# Patient Record
Sex: MALE | Race: WHITE | Employment: STUDENT | URBAN - NONMETROPOLITAN AREA
[De-identification: names, ages, dates, MRNs, and addresses within clinical notes are randomized per-mention and may not be internally consistent; named-entity substitution may affect disease eponyms.]

---
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
## 2023-07-10 ENCOUNTER — DOCTOR'S OFFICE (OUTPATIENT)
Dept: URBAN - NONMETROPOLITAN AREA CLINIC 1 | Facility: CLINIC | Age: 18
Setting detail: OPHTHALMOLOGY
End: 2023-07-10
Payer: COMMERCIAL

## 2023-07-10 DIAGNOSIS — H52.203: ICD-10-CM

## 2023-07-10 DIAGNOSIS — H52.13: ICD-10-CM

## 2023-07-10 PROCEDURE — 92014 COMPRE OPH EXAM EST PT 1/>: CPT | Performed by: OPTOMETRIST

## 2023-07-10 ASSESSMENT — REFRACTION_MANIFEST
OS_CYLINDER: -2.50
OD_VA2: 20/20-2
OS_VA1: 20/20-2
OD_VA1: 20/20-2
OD_CYLINDER: -1.50
OS_VA2: 20/20-2
OS_AXIS: 165
OD_SPHERE: -5.00
OD_AXIS: 015
OS_SPHERE: -5.00

## 2023-07-10 ASSESSMENT — REFRACTION_CURRENTRX
OS_SPHERE: -4.75
OD_VPRISM_DIRECTION: SV
OD_CYLINDER: -1.50
OS_AXIS: 166
OD_SPHERE: -4.75
OS_VPRISM_DIRECTION: SV
OS_CYLINDER: -2.75
OS_OVR_VA: 20/
OD_AXIS: 013
OD_OVR_VA: 20/

## 2023-07-10 ASSESSMENT — REFRACTION_AUTOREFRACTION
OD_AXIS: 017
OS_CYLINDER: -2.50
OS_SPHERE: -4.75
OD_CYLINDER: -1.25
OD_SPHERE: -5.00
OS_AXIS: 169

## 2023-07-10 ASSESSMENT — TONOMETRY
OD_IOP_MMHG: 18
OS_IOP_MMHG: 18

## 2023-07-10 ASSESSMENT — SPHEQUIV_DERIVED
OD_SPHEQUIV: -5.75
OS_SPHEQUIV: -6.25
OD_SPHEQUIV: -5.625
OS_SPHEQUIV: -6

## 2023-07-10 ASSESSMENT — CONFRONTATIONAL VISUAL FIELD TEST (CVF)
OD_FINDINGS: FULL
OS_FINDINGS: FULL

## 2023-07-10 ASSESSMENT — VISUAL ACUITY
OS_BCVA: 20/20-1
OD_BCVA: 20/20-1

## 2024-04-04 ENCOUNTER — OPTICAL OFFICE (OUTPATIENT)
Dept: URBAN - NONMETROPOLITAN AREA CLINIC 4 | Facility: CLINIC | Age: 19
Setting detail: OPHTHALMOLOGY
End: 2024-04-04
Payer: COMMERCIAL

## 2024-04-04 DIAGNOSIS — H52.203: ICD-10-CM

## 2024-04-04 PROCEDURE — V2108 SPHEROCYLINDER 4.25D/2.12-4D: HCPCS | Mod: LT | Performed by: OPTOMETRIST

## 2024-04-04 PROCEDURE — V2744 TINT PHOTOCHROMATIC LENS/ES: HCPCS | Mod: LT | Performed by: OPTOMETRIST

## 2024-04-04 PROCEDURE — V2784 LENS POLYCARB OR EQUAL: HCPCS | Performed by: OPTOMETRIST

## 2024-04-04 PROCEDURE — V2750 ANTI-REFLECTIVE COATING: HCPCS | Performed by: OPTOMETRIST

## 2024-04-04 PROCEDURE — V2784 LENS POLYCARB OR EQUAL: HCPCS | Mod: LT | Performed by: OPTOMETRIST

## 2024-04-04 PROCEDURE — V2744 TINT PHOTOCHROMATIC LENS/ES: HCPCS | Performed by: OPTOMETRIST

## 2024-04-04 PROCEDURE — V2107 SPHEROCYLINDER 4.25D/12-2D: HCPCS | Mod: RT | Performed by: OPTOMETRIST

## 2024-04-04 PROCEDURE — V2020 VISION SVCS FRAMES PURCHASES: HCPCS | Performed by: OPTOMETRIST

## 2024-04-04 PROCEDURE — V2750 ANTI-REFLECTIVE COATING: HCPCS | Mod: LT | Performed by: OPTOMETRIST

## 2024-04-11 ENCOUNTER — APPOINTMENT (EMERGENCY)
Dept: RADIOLOGY | Facility: HOSPITAL | Age: 19
End: 2024-04-11
Payer: COMMERCIAL

## 2024-04-11 ENCOUNTER — HOSPITAL ENCOUNTER (EMERGENCY)
Facility: HOSPITAL | Age: 19
Discharge: HOME | End: 2024-04-11
Payer: COMMERCIAL

## 2024-04-11 VITALS
RESPIRATION RATE: 20 BRPM | OXYGEN SATURATION: 98 % | SYSTOLIC BLOOD PRESSURE: 140 MMHG | DIASTOLIC BLOOD PRESSURE: 103 MMHG | HEART RATE: 108 BPM | TEMPERATURE: 98 F

## 2024-04-11 DIAGNOSIS — S83.91XA RIGHT KNEE SPRAIN: Primary | ICD-10-CM

## 2024-04-11 DIAGNOSIS — M79.671 RIGHT FOOT PAIN: ICD-10-CM

## 2024-04-11 PROCEDURE — 99284 EMERGENCY DEPT VISIT MOD MDM: CPT | Performed by: PHYSICIAN ASSISTANT

## 2024-04-11 PROCEDURE — 99283 EMERGENCY DEPT VISIT LOW MDM: CPT

## 2024-04-11 PROCEDURE — 73630 X-RAY EXAM OF FOOT: CPT

## 2024-04-11 PROCEDURE — 73564 X-RAY EXAM KNEE 4 OR MORE: CPT

## 2024-04-11 RX ORDER — ACETAMINOPHEN 325 MG/1
650 TABLET ORAL ONCE
Status: COMPLETED | OUTPATIENT
Start: 2024-04-11 | End: 2024-04-11

## 2024-04-11 RX ADMIN — ACETAMINOPHEN 325MG 650 MG: 325 TABLET ORAL at 18:40

## 2024-04-11 NOTE — DISCHARGE INSTRUCTIONS
Utilize knee immobilizer as we discussed.  Use crutches.  Follow-up with our sports medicine physician.  Return with any new or worsening symptoms  Your x-ray was reviewed today in the emergency department and there was no obvious abnormalities found, however, the imaging will be reviewed by the radiologist later this evening or the following day and if there is any abnormalities found you will get a phone call with those results.

## 2024-04-11 NOTE — Clinical Note
Darion Mchugh was seen and treated in our emergency department on 4/11/2024.                Diagnosis:     Darion  may return to work on return date.    He may return on this date: 04/12/2024    Please make appropriate accommodations while patient is utilizing brace and crutches until cleared by orthopedics     If you have any questions or concerns, please don't hesitate to call.      Taniya Cao PA-C    ______________________________           _______________          _______________  Hospital Representative                              Date                                Time

## 2024-04-11 NOTE — ED PROVIDER NOTES
"History  Chief Complaint   Patient presents with    Knee Injury     Injured his right knee while playing basketball. States he is able to walk on it but its painful and is also having pain to the top of his right foot     19-year-old male presents emergency department for evaluation of right knee and right foot pain.  Patient states just prior to arrival he was playing basketball with friends when he \"did something\" to the right knee.  States he is able to walk but the knee feels unsteady.  Reports \"it feels like it is moving forward and left\" when he ambulates.  He denies any previous injury to the knee.  Denies any significant swelling.  Denies any direct trauma or injury to the foot.        None       History reviewed. No pertinent past medical history.    History reviewed. No pertinent surgical history.    History reviewed. No pertinent family history.  I have reviewed and agree with the history as documented.    E-Cigarette/Vaping     E-Cigarette/Vaping Substances     Social History     Tobacco Use    Smoking status: Never    Smokeless tobacco: Never   Substance Use Topics    Alcohol use: Not Currently    Drug use: Not Currently       Review of Systems   Constitutional:  Negative for appetite change, fatigue and fever.   Respiratory: Negative.     Cardiovascular: Negative.    Musculoskeletal:  Positive for arthralgias. Negative for joint swelling.   Skin: Negative.  Negative for color change.   Neurological: Negative.    All other systems reviewed and are negative.      Physical Exam  Physical Exam  Vitals and nursing note reviewed.   Constitutional:       General: He is not in acute distress.     Appearance: Normal appearance. He is not ill-appearing, toxic-appearing or diaphoretic.   HENT:      Head: Normocephalic.   Eyes:      Conjunctiva/sclera: Conjunctivae normal.   Pulmonary:      Effort: Pulmonary effort is normal.   Musculoskeletal:         General: Tenderness present. Normal range of motion.      " Right knee: Bony tenderness present. No swelling. Normal range of motion. Tenderness present over the medial joint line.      Right foot: Normal range of motion. Tenderness and bony tenderness present. No swelling.   Skin:     General: Skin is warm and dry.      Findings: No bruising, erythema or rash.   Neurological:      General: No focal deficit present.      Mental Status: He is alert.   Psychiatric:         Mood and Affect: Mood normal.         Vital Signs  ED Triage Vitals [04/11/24 1801]   Temperature Pulse Respirations Blood Pressure SpO2   98 °F (36.7 °C) (!) 108 20 (!) 140/103 98 %      Temp Source Heart Rate Source Patient Position - Orthostatic VS BP Location FiO2 (%)   Temporal Monitor Sitting -- --      Pain Score       5           Vitals:    04/11/24 1801   BP: (!) 140/103   Pulse: (!) 108   Patient Position - Orthostatic VS: Sitting         Visual Acuity      ED Medications  Medications   acetaminophen (TYLENOL) tablet 650 mg (650 mg Oral Given 4/11/24 1840)       Diagnostic Studies  Results Reviewed       None                   XR knee 4+ vw right injury    (Results Pending)   XR foot 3+ vw right    (Results Pending)              Procedures  Procedures         ED Course  ED Course as of 04/11/24 1941   Thu Apr 11, 2024 1940 This is a late entry.  ED interpretation of foot and knee x-ray were negative for acute osseous injury.  Due to patient's instability feeling with ambulation was treated with knee immobilizer and crutches.  We did discuss follow-up with sports medicine and he verbalized understanding.  Patient was clinically and hemodynamically stable for discharge                               SBIRT 22yo+      Flowsheet Row Most Recent Value   Initial Alcohol Screen: US AUDIT-C     1. How often do you have a drink containing alcohol? 2 Filed at: 04/11/2024 1841   2. How many drinks containing alcohol do you have on a typical day you are drinking?  0 Filed at: 04/11/2024 1841   3a. Male UNDER  65: How often do you have five or more drinks on one occasion? 0 Filed at: 04/11/2024 1841   3b. FEMALE Any Age, or MALE 65+: How often do you have 4 or more drinks on one occassion? 0 Filed at: 04/11/2024 1841   Audit-C Score 2 Filed at: 04/11/2024 1841   MARY ANNE: How many times in the past year have you...    Used an illegal drug or used a prescription medication for non-medical reasons? Never Filed at: 04/11/2024 1841                      Medical Decision Making  19 year old male presented to the ED for evaluation of right knee pain and right foot pain status post play basketball.  Patient at risk for the following but not limited to fracture, contusion, dislocation, sprain, ligamentous injury.  ED interpretation of x-ray negative for acute osseous injury.  Patient was treated symptomatically.  We discussed RICE therapy, appropriate follow-up and symptomatic treatment at home.  We discussed strict return precautions and patient verbalized understanding.  Patient was clinically and hemodynamically stable for discharge.      Amount and/or Complexity of Data Reviewed  Radiology: ordered.    Risk  OTC drugs.             Disposition  Final diagnoses:   Right knee sprain   Right foot pain     Time reflects when diagnosis was documented in both MDM as applicable and the Disposition within this note       Time User Action Codes Description Comment    4/11/2024  7:05 PM Taniya Cao Add [S83.91XA] Right knee sprain     4/11/2024  7:05 PM Taniya Cao Add [M79.671] Right foot pain           ED Disposition       ED Disposition   Discharge    Condition   Stable    Date/Time   Thu Apr 11, 2024 1905    Comment   Darion Mchugh discharge to home/self care.                   Follow-up Information       Follow up With Specialties Details Why Contact Info    Latanya Braxton DO Family Medicine   [address] [place] [id number] [phone]      Ry Gtz MD Orthopedic Surgery, Sports Medicine   [address]  [facility]  [address] [place] [id number]  [phone]              There are no discharge medications for this patient.          PDMP Review       None            ED Provider  Electronically Signed by             Taniya Cao PA-C  04/11/24 6137

## 2024-04-19 VITALS
DIASTOLIC BLOOD PRESSURE: 80 MMHG | SYSTOLIC BLOOD PRESSURE: 130 MMHG | TEMPERATURE: 97.7 F | WEIGHT: 315 LBS | BODY MASS INDEX: 49.44 KG/M2 | HEART RATE: 81 BPM | HEIGHT: 67 IN

## 2024-04-19 DIAGNOSIS — M25.561 ACUTE PAIN OF RIGHT KNEE: Primary | ICD-10-CM

## 2024-04-19 DIAGNOSIS — S93.601A SPRAIN OF RIGHT FOOT, INITIAL ENCOUNTER: ICD-10-CM
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
## 2024-04-19 DIAGNOSIS — S83.8X1A SPRAIN OF OTHER LIGAMENT OF RIGHT KNEE, INITIAL ENCOUNTER: ICD-10-CM

## 2024-04-19 PROCEDURE — 99204 OFFICE O/P NEW MOD 45 MIN: CPT | Performed by: STUDENT IN AN ORGANIZED HEALTH CARE EDUCATION/TRAINING PROGRAM

## 2024-04-19 RX ORDER — NAPROXEN 500 MG/1
500 TABLET ORAL 2 TIMES DAILY WITH MEALS
Qty: 14 TABLET | Refills: 0 | Status: SHIPPED | OUTPATIENT
Start: 2024-04-19 | End: 2024-04-26

## 2024-04-19 NOTE — PROGRESS NOTES
1. Acute pain of right knee        2. Sprain of other ligament of right knee, initial encounter  Ambulatory Referral to Orthopedic Surgery    naproxen (NAPROSYN) 500 mg tablet      3. Sprain of right foot, initial encounter  naproxen (NAPROSYN) 500 mg tablet      4. Injury while playing basketball          No orders of the defined types were placed in this encounter.       Imaging Studies (I personally reviewed images in PACS and report):    X-ray right foot 4/11/2024: No acute osseous abnormalities.  No significant degenerative changes.  Unremarkable soft tissues.  X-ray right knee 4/11/2024: No acute osseous abnormalities.  No joint effusion.  No significant degenerative changes.  Unremarkable soft tissues.    IMPRESSION:  Acute right knee pain/injury - suspected MCL sprain, medial myofascial strain  Acute right dorsal foot pain/injury-improving and suspected myofascial strain  Secondary to twisting injury while playing basketball    Other factors:  BMI 50    PLAN:    Clinical exam and radiographic imaging reviewed with patient today, with impression as per above. I have discussed with the patient the pathophysiology of this diagnosis and reviewed how the examination correlates with this diagnosis.    Imaging obtained/reviewed as per above.   Recommended conservative treatment at this time.  Counseled to discontinue use of immobilizer and crutches at this time and can weight-bear as tolerated on his right lower extremity.  Counseled that he can continue to watch and wait for now given the symptoms are improving or he can be seen by formal physical therapy to help facilitate recovery.  I counseled that sprains of the knee typically take about 4 to 6 weeks to recover.  Patient prefers to watch and wait for now and manage the pain and do home exercise/stretches on his own.  In regards to pain control prescribed naproxen 500 mg p.o. twice daily to consistently take for 1 week then only as needed thereafter.  Counseled  "not to concurrently take other NSAIDs with naproxen but could use acetaminophen along with heat/ice therapy 20 minutes on/off.  Offered hinged knee brace for stability while recovering but patient deferred this option for now.  I counseled he could also use a compression knee sleeve as needed.    Return if symptoms worsen or fail to improve.    Portions of the record may have been created with voice recognition software. Occasional wrong word or \"sound a like\" substitutions may have occurred due to the inherent limitations of voice recognition software. Read the chart carefully and recognize, using context, where substitutions have occurred.     CHIEF COMPLAINT:  Chief Complaint   Patient presents with    Right Knee - Pain    Right Foot - Pain       HPI:  Darion Mchugh is a 19 y.o. male  who presents for       Visit 4/19/2024:  Initial evaluation of right knee and foot injury:  Precipitating injury on 4/11/2024  Injury occurred while participating in basketball.  While performing a layup he believes he landed awkwardly on his right lower extremity causing him to twisting sensation his right knee and foot.  He denies a popping sensation but did feel a straining sensation of his right knee and right dorsal foot.  He states he was still able to weight-bear after this incident on his right lower extremity with a slight limp.  He went to the ER the same day and had imaging done as noted above. ER note reviewed.  He was placed in a knee immobilizer and crutches.  Patient admits he has not been fully adherent to using the immobilizer as it continues to slide down.  He also feels that his pain has been improving that he has been able to tolerate weightbearing on his right lower extremity with less intense pain over time.  In regards to his right foot he states the pain at this point is mainly over the dorsum of his foot he states is of mild intensity and describes as an aching sensation exacerbated by range of motion " "movements of his foot.  He states his right knee pain is mainly localized over the medial aspect.  Again he states the pain has been progressively improving and describes as a sharp/aching pain that is worse with flexion of his knee.  He does report a generalized stiffness of his knee in general but thinks it may have been due to the bracing.  He denies any swelling, discoloration, numbness/tingling.  Denies prior surgeries of his right knee in the past.  In regards to pain control he has been using acetaminophen, NSAIDs, icing all of which provide some relief.  4/19/2024.      Medical, Surgical, Family, and Social History    History reviewed. No pertinent past medical history.  History reviewed. No pertinent surgical history.  Social History   Social History     Substance and Sexual Activity   Alcohol Use Yes    Comment: socially     Social History     Substance and Sexual Activity   Drug Use Never     Social History     Tobacco Use   Smoking Status Never   Smokeless Tobacco Never     History reviewed. No pertinent family history.  No Known Allergies       Physical Exam  /80   Pulse 81   Temp 97.7 °F (36.5 °C) (Temporal)   Ht 5' 7\" (1.702 m)   Wt (!) 147 kg (323 lb)   BMI 50.59 kg/m²     Constitutional:  see vital signs  Gen: Obese, normocephalic/atraumatic, well-groomed  Eyes: No inflammation or discharge of conjunctiva or lids; sclera clear   Pharynx: no inflammation, lesion, or mass of lips  Pulmonary/Chest: Effort normal. No respiratory distress.     Ortho Exam  Patient seen wearing/using knee immobilizer for right lower extremity    Right Knee Exam:  Erythema: no  Swelling: no  Increased Warmth: no  Tenderness: +MJL  ROM: 0-130  Knee flexion strength: 5/5  Knee extension strength: 5/5  Patellar Grind: negative  Lachman's: negative  Anterior Drawer: negative  Posterior Drawer:  Varus laxity: negative  Valgus laxity: negative, but +aggravation of medial knee pain  Allie: negative     No calf " tenderness to palpation bilaterally    Right foot exam:  Inspection: No edema, erythema, ecchymosis, wounds or drainage  Palpation: Positive tenderness over the general dorsal aspect of the midfoot/tibialis anterior  ROM: Dorsiflexion 30 degrees, plantarflexion 50 degrees  Strength: Resisted extension 5/5 in aggravates dorsal foot pain, resisted plantarflexion 5/5, eversion 5/5, inversion 5/5    Gait: slight antalgia    Procedures

## 2024-04-19 NOTE — LETTER
April 19, 2024     Patient: Darion Mchugh  YOB: 2005  Date of Visit: 4/19/2024      To Whom it May Concern:    Darion Mchugh is under my professional care. Darion was seen in my office on 4/19/2024. Please excuse him from work today. Can return tomorrow as tolerated without restrictions on 4/20/2024.    If you have any questions or concerns, please don't hesitate to call.         Sincerely,          Ry Gtz MD        CC: No Recipients

## 2024-08-26 ENCOUNTER — DOCTOR'S OFFICE (OUTPATIENT)
Dept: URBAN - NONMETROPOLITAN AREA CLINIC 1 | Facility: CLINIC | Age: 19
Setting detail: OPHTHALMOLOGY
End: 2024-08-26
Payer: COMMERCIAL

## 2024-08-26 VITALS — HEIGHT: 60 IN

## 2024-08-26 DIAGNOSIS — H52.203: ICD-10-CM

## 2024-08-26 DIAGNOSIS — H52.13: ICD-10-CM

## 2024-08-26 PROCEDURE — 92014 COMPRE OPH EXAM EST PT 1/>: CPT | Performed by: OPTOMETRIST

## 2024-08-26 ASSESSMENT — CONFRONTATIONAL VISUAL FIELD TEST (CVF)
OS_FINDINGS: FULL
OD_FINDINGS: FULL

## 2025-08-27 ENCOUNTER — DOCTOR'S OFFICE (OUTPATIENT)
Dept: URBAN - NONMETROPOLITAN AREA CLINIC 1 | Facility: CLINIC | Age: 20
Setting detail: OPHTHALMOLOGY
End: 2025-08-27
Payer: COMMERCIAL

## 2025-08-27 DIAGNOSIS — H52.13: ICD-10-CM

## 2025-08-27 DIAGNOSIS — H52.203: ICD-10-CM

## 2025-08-27 PROCEDURE — 92014 COMPRE OPH EXAM EST PT 1/>: CPT | Performed by: OPTOMETRIST

## 2025-08-27 ASSESSMENT — TONOMETRY
OD_IOP_MMHG: 17
OS_IOP_MMHG: 17

## 2025-08-27 ASSESSMENT — REFRACTION_MANIFEST
OD_AXIS: 015
OS_CYLINDER: -2.50
OS_AXIS: 165
OS_VA2: 20/20-2
OS_SPHERE: -4.75
OD_VA1: 20/20-2
OD_SPHERE: -5.00
OS_VA1: 20/20-2
OD_CYLINDER: -1.50
OD_VA2: 20/20-2

## 2025-08-27 ASSESSMENT — REFRACTION_AUTOREFRACTION
OS_CYLINDER: -2.50
OD_CYLINDER: -1.00
OD_SPHERE: -5.25
OS_AXIS: 163
OD_AXIS: 033
OS_SPHERE: -4.50

## 2025-08-27 ASSESSMENT — REFRACTION_CURRENTRX
OS_VPRISM_DIRECTION: SV
OS_SPHERE: -4.50
OD_OVR_VA: 20/
OS_AXIS: 163
OD_AXIS: 011
OS_OVR_VA: 20/
OD_VPRISM_DIRECTION: SV
OD_CYLINDER: -1.50
OD_SPHERE: -5.00
OS_CYLINDER: -2.50

## 2025-08-27 ASSESSMENT — VISUAL ACUITY
OS_BCVA: 20/20
OD_BCVA: 20/20-2

## 2025-08-27 ASSESSMENT — CONFRONTATIONAL VISUAL FIELD TEST (CVF)
OS_FINDINGS: FULL
OD_FINDINGS: FULL